# Patient Record
Sex: FEMALE | Race: WHITE | Employment: FULL TIME | URBAN - METROPOLITAN AREA
[De-identification: names, ages, dates, MRNs, and addresses within clinical notes are randomized per-mention and may not be internally consistent; named-entity substitution may affect disease eponyms.]

---

## 2018-05-10 ENCOUNTER — OFFICE VISIT (OUTPATIENT)
Dept: FAMILY MEDICINE CLINIC | Age: 53
End: 2018-05-10
Payer: MEDICARE

## 2018-05-10 VITALS
OXYGEN SATURATION: 98 % | BODY MASS INDEX: 25 KG/M2 | HEIGHT: 64 IN | WEIGHT: 146.4 LBS | SYSTOLIC BLOOD PRESSURE: 110 MMHG | TEMPERATURE: 98.3 F | DIASTOLIC BLOOD PRESSURE: 76 MMHG | HEART RATE: 83 BPM

## 2018-05-10 DIAGNOSIS — J06.9 ACUTE URI: Primary | ICD-10-CM

## 2018-05-10 PROCEDURE — G8427 DOCREV CUR MEDS BY ELIG CLIN: HCPCS | Performed by: FAMILY MEDICINE

## 2018-05-10 PROCEDURE — 1036F TOBACCO NON-USER: CPT | Performed by: FAMILY MEDICINE

## 2018-05-10 PROCEDURE — 99213 OFFICE O/P EST LOW 20 MIN: CPT | Performed by: FAMILY MEDICINE

## 2018-05-10 PROCEDURE — G8419 CALC BMI OUT NRM PARAM NOF/U: HCPCS | Performed by: FAMILY MEDICINE

## 2018-05-10 PROCEDURE — 3017F COLORECTAL CA SCREEN DOC REV: CPT | Performed by: FAMILY MEDICINE

## 2018-05-10 RX ORDER — AMOXICILLIN 250 MG/1
500 CAPSULE ORAL 2 TIMES DAILY
Qty: 40 CAPSULE | Refills: 0 | Status: SHIPPED | OUTPATIENT
Start: 2018-05-10 | End: 2018-05-20

## 2018-05-10 ASSESSMENT — ENCOUNTER SYMPTOMS
CONSTIPATION: 0
SORE THROAT: 0
RHINORRHEA: 1
SHORTNESS OF BREATH: 0
DIARRHEA: 0
COUGH: 1
ABDOMINAL PAIN: 0
WHEEZING: 0
VOICE CHANGE: 1

## 2018-05-10 ASSESSMENT — PATIENT HEALTH QUESTIONNAIRE - PHQ9
1. LITTLE INTEREST OR PLEASURE IN DOING THINGS: 0
SUM OF ALL RESPONSES TO PHQ QUESTIONS 1-9: 0
2. FEELING DOWN, DEPRESSED OR HOPELESS: 0
SUM OF ALL RESPONSES TO PHQ9 QUESTIONS 1 & 2: 0

## 2018-05-29 ENCOUNTER — HOSPITAL ENCOUNTER (OUTPATIENT)
Dept: GENERAL RADIOLOGY | Age: 53
Discharge: HOME | End: 2018-05-31
Payer: MEDICARE

## 2018-05-29 ENCOUNTER — HOSPITAL ENCOUNTER (OUTPATIENT)
Age: 53
Discharge: HOME | End: 2018-05-31
Payer: MEDICARE

## 2018-05-29 ENCOUNTER — OFFICE VISIT (OUTPATIENT)
Dept: FAMILY MEDICINE CLINIC | Age: 53
End: 2018-05-29
Payer: MEDICARE

## 2018-05-29 VITALS
BODY MASS INDEX: 26.19 KG/M2 | SYSTOLIC BLOOD PRESSURE: 98 MMHG | HEIGHT: 63 IN | OXYGEN SATURATION: 98 % | TEMPERATURE: 98 F | HEART RATE: 77 BPM | DIASTOLIC BLOOD PRESSURE: 62 MMHG | WEIGHT: 147.8 LBS

## 2018-05-29 DIAGNOSIS — R05.3 PERSISTENT COUGH FOR 3 WEEKS OR LONGER: Primary | ICD-10-CM

## 2018-05-29 DIAGNOSIS — R09.89 HYPERINFLATION OF LUNGS: ICD-10-CM

## 2018-05-29 DIAGNOSIS — R05.3 PERSISTENT COUGH FOR 3 WEEKS OR LONGER: ICD-10-CM

## 2018-05-29 PROCEDURE — 71046 X-RAY EXAM CHEST 2 VIEWS: CPT

## 2018-05-29 PROCEDURE — 99213 OFFICE O/P EST LOW 20 MIN: CPT | Performed by: PHYSICIAN ASSISTANT

## 2018-05-29 PROCEDURE — 3017F COLORECTAL CA SCREEN DOC REV: CPT | Performed by: PHYSICIAN ASSISTANT

## 2018-05-29 PROCEDURE — G8419 CALC BMI OUT NRM PARAM NOF/U: HCPCS | Performed by: PHYSICIAN ASSISTANT

## 2018-05-29 PROCEDURE — G8427 DOCREV CUR MEDS BY ELIG CLIN: HCPCS | Performed by: PHYSICIAN ASSISTANT

## 2018-05-29 PROCEDURE — 1036F TOBACCO NON-USER: CPT | Performed by: PHYSICIAN ASSISTANT

## 2018-05-29 RX ORDER — ALBUTEROL SULFATE 90 UG/1
2 AEROSOL, METERED RESPIRATORY (INHALATION) EVERY 6 HOURS PRN
Qty: 1 INHALER | Refills: 0 | Status: SHIPPED | OUTPATIENT
Start: 2018-05-29 | End: 2020-11-09 | Stop reason: ALTCHOICE

## 2018-05-29 RX ORDER — LORATADINE 10 MG/1
10 TABLET ORAL DAILY
Qty: 30 TABLET | Refills: 0 | Status: SHIPPED | OUTPATIENT
Start: 2018-05-29 | End: 2018-07-24 | Stop reason: CLARIF

## 2018-05-29 ASSESSMENT — ENCOUNTER SYMPTOMS
RHINORRHEA: 0
SORE THROAT: 0
COUGH: 1
WHEEZING: 0
SHORTNESS OF BREATH: 0
SINUS PAIN: 0
SINUS PRESSURE: 0
CHEST TIGHTNESS: 1

## 2018-05-30 ENCOUNTER — TELEPHONE (OUTPATIENT)
Dept: FAMILY MEDICINE CLINIC | Age: 53
End: 2018-05-30

## 2018-05-30 DIAGNOSIS — R09.89 HYPERINFLATION OF LUNGS: Primary | ICD-10-CM

## 2018-06-07 DIAGNOSIS — Z12.39 BREAST CANCER SCREENING: Primary | ICD-10-CM

## 2018-07-24 ENCOUNTER — OFFICE VISIT (OUTPATIENT)
Dept: PULMONOLOGY | Age: 53
End: 2018-07-24
Payer: MEDICARE

## 2018-07-24 VITALS
OXYGEN SATURATION: 99 % | BODY MASS INDEX: 26.22 KG/M2 | SYSTOLIC BLOOD PRESSURE: 114 MMHG | DIASTOLIC BLOOD PRESSURE: 70 MMHG | HEIGHT: 63 IN | RESPIRATION RATE: 16 BRPM | WEIGHT: 148 LBS | HEART RATE: 77 BPM

## 2018-07-24 DIAGNOSIS — R53.83 FATIGUE, UNSPECIFIED TYPE: ICD-10-CM

## 2018-07-24 DIAGNOSIS — J44.9 CHRONIC OBSTRUCTIVE PULMONARY DISEASE, UNSPECIFIED COPD TYPE (HCC): Primary | ICD-10-CM

## 2018-07-24 PROCEDURE — 99204 OFFICE O/P NEW MOD 45 MIN: CPT | Performed by: INTERNAL MEDICINE

## 2018-07-24 PROCEDURE — G8427 DOCREV CUR MEDS BY ELIG CLIN: HCPCS | Performed by: INTERNAL MEDICINE

## 2018-07-24 PROCEDURE — 3023F SPIROM DOC REV: CPT | Performed by: INTERNAL MEDICINE

## 2018-07-24 PROCEDURE — G8419 CALC BMI OUT NRM PARAM NOF/U: HCPCS | Performed by: INTERNAL MEDICINE

## 2018-07-24 PROCEDURE — 3017F COLORECTAL CA SCREEN DOC REV: CPT | Performed by: INTERNAL MEDICINE

## 2018-07-24 PROCEDURE — 1036F TOBACCO NON-USER: CPT | Performed by: INTERNAL MEDICINE

## 2018-07-24 PROCEDURE — G8926 SPIRO NO PERF OR DOC: HCPCS | Performed by: INTERNAL MEDICINE

## 2018-07-24 ASSESSMENT — ENCOUNTER SYMPTOMS
DIARRHEA: 0
SORE THROAT: 0
ABDOMINAL PAIN: 0
WHEEZING: 0
NAUSEA: 0
SHORTNESS OF BREATH: 0
CHEST TIGHTNESS: 0
COUGH: 0
VOMITING: 0
SINUS PRESSURE: 0
RHINORRHEA: 0

## 2018-07-24 NOTE — PROGRESS NOTES
congestion, nosebleeds, postnasal drip, rhinorrhea, sinus pressure, sneezing and sore throat. Eyes: Negative for visual disturbance. Respiratory: Negative for cough, chest tightness, shortness of breath and wheezing. Cardiovascular: Negative for chest pain, palpitations and leg swelling. Gastrointestinal: Negative for abdominal pain, diarrhea, nausea and vomiting. Genitourinary: Negative for dysuria and urgency. Musculoskeletal: Negative for arthralgias, joint swelling and myalgias. Skin: Negative for rash. Allergic/Immunologic: Negative for environmental allergies. Neurological: Negative for tremors, weakness, light-headedness and headaches. Psychiatric/Behavioral: Negative for behavioral problems and sleep disturbance. Objective:     Vitals:    07/24/18 1516   BP: 114/70   Pulse: 77   Resp: 16   SpO2: 99%   Weight: 148 lb (67.1 kg)   Height: 5' 3\" (1.6 m)         Physical Exam   Constitutional: She is oriented to person, place, and time. She appears well-developed and well-nourished. HENT:   Head: Normocephalic and atraumatic. Mouth/Throat: Oropharynx is clear and moist.   Eyes: EOM are normal. Pupils are equal, round, and reactive to light. Neck: No JVD present. No tracheal deviation present. No thyromegaly present. Cardiovascular: Normal rate and regular rhythm. Exam reveals no gallop. No murmur heard. Pulmonary/Chest: She has no wheezes. She has no rales. She exhibits no tenderness. Abdominal: She exhibits no distension. Musculoskeletal: Normal range of motion. She exhibits no edema. Neurological: She is alert and oriented to person, place, and time. Coordination normal.   Skin: Skin is warm and dry. No rash noted. Psychiatric: She has a normal mood and affect. Assessment:      Diagnosis Orders   1. Chronic obstructive pulmonary disease, unspecified COPD type ([facility] [address]  FULL PFT STUDY WITH PRE AND POST    Pulse oximetry, overnight   2.  Fatigue, unspecified type       The diagnosis of COPD, appropriate way to establish diagnoses and assess severity with PFTs were all discussed today at length. She is fairly asymptomatic at this point but reports excessive fatigue, lack of energy, and sleepiness especially since her recent illness. We will obtain PFTs and nocturnal oximetry testing then I'll see her for follow-up in 6 weeks      Plan:     Orders Placed This Encounter   Procedures    Pulse oximetry, overnight     Standing Status:   Future     Standing Expiration Date:   8/24/2018    FULL PFT STUDY WITH PRE AND POST     Standing Status:   Future     Standing Expiration Date:   8/24/2018     No orders of the defined types were placed in this encounter. Return in about 6 weeks (around 9/4/2018) for re-evaluation, after PFTs, review tests.       Levon Cormier MD

## 2018-08-07 ENCOUNTER — HOSPITAL ENCOUNTER (OUTPATIENT)
Dept: PULMONOLOGY | Age: 53
Discharge: HOME | End: 2018-08-07
Payer: MEDICARE

## 2018-08-07 DIAGNOSIS — J44.9 CHRONIC OBSTRUCTIVE PULMONARY DISEASE, UNSPECIFIED COPD TYPE (HCC): ICD-10-CM

## 2018-08-07 PROCEDURE — 94726 PLETHYSMOGRAPHY LUNG VOLUMES: CPT

## 2018-08-07 PROCEDURE — 94729 DIFFUSING CAPACITY: CPT

## 2018-08-07 PROCEDURE — 94729 DIFFUSING CAPACITY: CPT | Performed by: INTERNAL MEDICINE

## 2018-08-07 PROCEDURE — 94010 BREATHING CAPACITY TEST: CPT

## 2018-08-07 PROCEDURE — 94060 EVALUATION OF WHEEZING: CPT | Performed by: INTERNAL MEDICINE

## 2018-08-07 PROCEDURE — 94726 PLETHYSMOGRAPHY LUNG VOLUMES: CPT | Performed by: INTERNAL MEDICINE

## 2018-08-13 NOTE — PROCEDURES
[address]                       [address], [address] [facility]                                PULMONARY FUNCTION    PATIENT NAME: Weston De Leon                      :        1965  MED REC NO:   [id number]                            ROOM:  ACCOUNT NO:   [de-identified]                           ADMIT DATE: 2018  PROVIDER:     Santy Gramajo MD    DATE OF PROCEDURE:  2018    PFTs were done on this 69-year-old patient, who is 5 feet 3 inches, weighs  146 pounds with 20-year smoking history, quit 14 years ago, presenting with  dry cough and mild dyspnea. Spirometry showed a forced vital capacity of 3.21 L, which is 96% of  predicted. FEV1 was 2.47 L, which is 95% of predicted. FEV1/FVC ratio was  normal at 77%. FEF 25-75% was adequate at 2.04 L per second, which is 81%  of predicted. MVV was normal.    Lung volumes done by body plethysmography showed a total lung capacity of  5.53 L, which is 112% of predicted. Residual volume was 2.36 L, which is  129% of predicted with mild increased RV/TLC ratio to 43%. Diffusion capacity was normal at 21.83, which is 100% of predicted. Airway resistance was decreased. Specific airway conductance was slightly  increased. OVERALL IMPRESSION:  This study is generally within normal limits. No  significant obstructive, restrictive, or diffusion abnormality noted.         Twana Kanner, MD    D: 2018 10:15:21       T: 2018 12:25:35     [email or web address]  Job#: [id number]     Doc#: [id number]    CC:

## 2018-09-06 ENCOUNTER — OFFICE VISIT (OUTPATIENT)
Dept: PULMONOLOGY | Age: 53
End: 2018-09-06
Payer: MEDICARE

## 2018-09-06 VITALS
HEIGHT: 64 IN | WEIGHT: 148.6 LBS | DIASTOLIC BLOOD PRESSURE: 72 MMHG | HEART RATE: 63 BPM | OXYGEN SATURATION: 98 % | TEMPERATURE: 97.1 F | BODY MASS INDEX: 25.37 KG/M2 | SYSTOLIC BLOOD PRESSURE: 116 MMHG

## 2018-09-06 DIAGNOSIS — R09.89 PULMONARY AIR TRAPPING: Primary | ICD-10-CM

## 2018-09-06 DIAGNOSIS — Z87.891 HISTORY OF SMOKING: ICD-10-CM

## 2018-09-06 PROCEDURE — 3017F COLORECTAL CA SCREEN DOC REV: CPT | Performed by: INTERNAL MEDICINE

## 2018-09-06 PROCEDURE — 1036F TOBACCO NON-USER: CPT | Performed by: INTERNAL MEDICINE

## 2018-09-06 PROCEDURE — G8419 CALC BMI OUT NRM PARAM NOF/U: HCPCS | Performed by: INTERNAL MEDICINE

## 2018-09-06 PROCEDURE — G8427 DOCREV CUR MEDS BY ELIG CLIN: HCPCS | Performed by: INTERNAL MEDICINE

## 2018-09-06 PROCEDURE — 99213 OFFICE O/P EST LOW 20 MIN: CPT | Performed by: INTERNAL MEDICINE

## 2019-05-29 DIAGNOSIS — Z12.39 SCREENING FOR BREAST CANCER: Primary | ICD-10-CM

## 2019-11-13 ENCOUNTER — TELEPHONE (OUTPATIENT)
Dept: FAMILY MEDICINE CLINIC | Age: 54
End: 2019-11-13

## 2020-06-12 ENCOUNTER — TELEPHONE (OUTPATIENT)
Dept: FAMILY MEDICINE CLINIC | Age: 55
End: 2020-06-12

## 2020-11-09 ENCOUNTER — OFFICE VISIT (OUTPATIENT)
Dept: FAMILY MEDICINE CLINIC | Age: 55
End: 2020-11-09
Payer: COMMERCIAL

## 2020-11-09 ENCOUNTER — HOSPITAL ENCOUNTER (OUTPATIENT)
Age: 55
Setting detail: SPECIMEN
Discharge: HOME | End: 2020-11-09
Payer: COMMERCIAL

## 2020-11-09 VITALS
HEART RATE: 76 BPM | WEIGHT: 144 LBS | TEMPERATURE: 98.2 F | SYSTOLIC BLOOD PRESSURE: 100 MMHG | HEIGHT: 63 IN | DIASTOLIC BLOOD PRESSURE: 68 MMHG | BODY MASS INDEX: 25.52 KG/M2 | OXYGEN SATURATION: 97 %

## 2020-11-09 LAB
HBA1C MFR BLD: 5.3 % (ref 4.8–5.9)
HCT VFR BLD CALC: 38.6 % (ref 37–47)
HEMOGLOBIN: 12.9 G/DL (ref 12–16)
MCH RBC QN AUTO: 32.1 PG (ref 27–31.3)
MCHC RBC AUTO-ENTMCNC: 33.5 % (ref 33–37)
MCV RBC AUTO: 95.7 FL (ref 82–100)
PDW BLD-RTO: 13.4 % (ref 11.5–14.5)
PLATELET # BLD: 307 K/UL (ref 130–400)
RBC # BLD: 4.03 M/UL (ref 4.2–5.4)
WBC # BLD: 7.5 K/UL (ref 4.8–10.8)

## 2020-11-09 PROCEDURE — 83721 ASSAY OF BLOOD LIPOPROTEIN: CPT

## 2020-11-09 PROCEDURE — 85027 COMPLETE CBC AUTOMATED: CPT

## 2020-11-09 PROCEDURE — 80061 LIPID PANEL: CPT

## 2020-11-09 PROCEDURE — 87389 HIV-1 AG W/HIV-1&-2 AB AG IA: CPT

## 2020-11-09 PROCEDURE — 80053 COMPREHEN METABOLIC PANEL: CPT

## 2020-11-09 PROCEDURE — 86803 HEPATITIS C AB TEST: CPT

## 2020-11-09 PROCEDURE — 99396 PREV VISIT EST AGE 40-64: CPT | Performed by: FAMILY MEDICINE

## 2020-11-09 PROCEDURE — 83036 HEMOGLOBIN GLYCOSYLATED A1C: CPT

## 2020-11-09 ASSESSMENT — ENCOUNTER SYMPTOMS
SHORTNESS OF BREATH: 0
ABDOMINAL PAIN: 0
DIARRHEA: 0
CONSTIPATION: 0
RHINORRHEA: 0
SORE THROAT: 0
WHEEZING: 0
COUGH: 0

## 2020-11-09 ASSESSMENT — PATIENT HEALTH QUESTIONNAIRE - PHQ9
2. FEELING DOWN, DEPRESSED OR HOPELESS: 0
SUM OF ALL RESPONSES TO PHQ QUESTIONS 1-9: 0
1. LITTLE INTEREST OR PLEASURE IN DOING THINGS: 0
SUM OF ALL RESPONSES TO PHQ9 QUESTIONS 1 & 2: 0
SUM OF ALL RESPONSES TO PHQ QUESTIONS 1-9: 0
SUM OF ALL RESPONSES TO PHQ QUESTIONS 1-9: 0

## 2020-11-09 NOTE — PROGRESS NOTES
[address] PRIMARY CARE  [address] [place] [address]  Dept: [phone]  Dept Fax: : [phone]   Chief Complaint  Chief Complaint   Patient presents with    Annual Exam     for life insurance. needs fasting labs ordered       HPI:  60 y. o.female who presents for annual exam:    Works at [employer], a bakery, and a restaurant. Had beef sandwich at lunch. Nonsmoker. History reviewed. No pertinent past medical history.   Past Surgical History:   Procedure Laterality Date    BREAST SURGERY  2010    minor, malignant    COLONOSCOPY  2016    DR Sana Bunch    HEMORRHOID SURGERY  10/10/2016    DR Sana Bunch     Social History     Socioeconomic History    Marital status: Single     Spouse name: Not on file    Number of children: Not on file    Years of education: Not on file    Highest education level: Not on file   Occupational History    Not on file   Social Needs    Financial resource strain: Not on file    Food insecurity     Worry: Not on file     Inability: Not on file    Transportation needs     Medical: Not on file     Non-medical: Not on file   Tobacco Use    Smoking status: Former Smoker     Packs/day: 0.75     Types: Cigarettes     Start date: 2000     Last attempt to quit: 2004     Years since quittin.8    Smokeless tobacco: Former User     Quit date: 2004   Substance and Sexual Activity    Alcohol use: No    Drug use: No    Sexual activity: Never   Lifestyle    Physical activity     Days per week: Not on file     Minutes per session: Not on file    Stress: Not on file   Relationships    Social connections     Talks on phone: Not on file     Gets together: Not on file     Attends Episcopalian service: Not on file     Active member of club or organization: Not on file     Attends meetings of clubs or organizations: Not on file     Relationship status: Not on file    Intimate partner violence     Fear of current or ex partner: Not on file     Emotionally abused: Not on file     Physically abused: Not on file     Forced sexual activity: Not on file   Other Topics Concern    Not on file   Social History Narrative    Not on file     No Known Allergies  Current Outpatient Medications   Medication Sig Dispense Refill    polyethylene glycol (GLYCOLAX) powder as needed        No current facility-administered medications for this visit. ROS:  Review of Systems   Constitutional: Negative for chills and fever. HENT: Negative for rhinorrhea and sore throat. Respiratory: Negative for cough, shortness of breath and wheezing. Gastrointestinal: Negative for abdominal pain, constipation and diarrhea. Endocrine: Negative for polydipsia and polyuria. Genitourinary: Negative for dysuria, frequency and urgency. Neurological: Negative for syncope, light-headedness, numbness and headaches. Psychiatric/Behavioral: Negative for sleep disturbance. The patient is not nervous/anxious. Vitals:    11/09/20 1335   BP: 100/68   Pulse: 76   Temp: 98.2 °F (36.8 °C)   TempSrc: Temporal   SpO2: 97%   Weight: 144 lb (65.3 kg)   Height: 5' 3\" (1.6 m)       Physical exam:  Physical Exam  Vitals signs reviewed. Constitutional:       General: She is not in acute distress. Appearance: She is well-developed. HENT:      Head: Normocephalic and atraumatic. Right Ear: Tympanic membrane, ear canal and external ear normal. Tympanic membrane is not erythematous. Tympanic membrane has normal mobility. Left Ear: Tympanic membrane, ear canal and external ear normal. Tympanic membrane is not erythematous. Tympanic membrane has normal mobility. Nose: Nose normal.      Mouth/Throat:      Pharynx: No oropharyngeal exudate. Neck:      Musculoskeletal: Normal range of motion. Thyroid: No thyromegaly.    Cardiovascular:      Rate and Rhythm: Normal rate and regular rhythm. Heart sounds: Normal heart sounds. No murmur. Pulmonary:      Effort: Pulmonary effort is normal. No respiratory distress. Breath sounds: Normal breath sounds. No wheezing. Abdominal:      General: Bowel sounds are normal. There is no distension. Palpations: Abdomen is soft. Tenderness: There is no abdominal tenderness. There is no guarding or rebound. Lymphadenopathy:      Cervical: No cervical adenopathy. Skin:     General: Skin is warm and dry. Neurological:      Mental Status: She is alert and oriented to person, place, and time. Psychiatric:         Behavior: Behavior normal.         Assessment/Plan:  60 y.o. female here mainly for annual exam:  - routine labs; she isn't fasting but returning for labs would be difficult so will get nonfasting today  - she will consider scheduling for PAP     Diagnosis Orders   1. Annual physical exam  Lipid Panel    LDL Cholesterol, Direct    Comprehensive Metabolic Panel    Hemoglobin A1C    CBC   2. Encounter for screening for HIV  HIV-1,2 Combo Ag/Ab By IJEOMA, Reflexive Panel   3.  Encounter for hepatitis C screening test for low risk patient  Hepatitis C Antibody        Return in about 1 year (around 11/9/2021) for annual exam.    Kashmir Hawthorne MD

## 2020-11-10 PROBLEM — R76.8 HEPATITIS C ANTIBODY POSITIVE IN BLOOD: Status: ACTIVE | Noted: 2020-11-10

## 2020-11-10 LAB
ALBUMIN SERPL-MCNC: 4.1 G/DL (ref 3.5–4.6)
ALP BLD-CCNC: 77 U/L (ref 40–130)
ALT SERPL-CCNC: 18 U/L (ref 0–33)
ANION GAP SERPL CALCULATED.3IONS-SCNC: 10 MEQ/L (ref 9–15)
AST SERPL-CCNC: 24 U/L (ref 0–35)
BILIRUB SERPL-MCNC: 0.3 MG/DL (ref 0.2–0.7)
BUN BLDV-MCNC: 23 MG/DL (ref 6–20)
CALCIUM SERPL-MCNC: 8.9 MG/DL (ref 8.5–9.9)
CHLORIDE BLD-SCNC: 109 MEQ/L (ref 95–107)
CHOLESTEROL, TOTAL: 153 MG/DL (ref 0–199)
CO2: 23 MEQ/L (ref 20–31)
CREAT SERPL-MCNC: 0.59 MG/DL (ref 0.5–0.9)
GFR AFRICAN AMERICAN: >60
GFR NON-AFRICAN AMERICAN: >60
GLOBULIN: 2.5 G/DL (ref 2.3–3.5)
GLUCOSE BLD-MCNC: 87 MG/DL (ref 70–99)
HDLC SERPL-MCNC: 49 MG/DL (ref 40–59)
HEPATITIS C ANTIBODY INTERPRETATION: REACTIVE
LDL CHOLESTEROL CALCULATED: 73 MG/DL (ref 0–129)
LDL CHOLESTEROL DIRECT: 83 MG/DL (ref 0–129)
POTASSIUM SERPL-SCNC: 4.3 MEQ/L (ref 3.4–4.9)
SODIUM BLD-SCNC: 142 MEQ/L (ref 135–144)
TOTAL PROTEIN: 6.6 G/DL (ref 6.3–8)
TRIGL SERPL-MCNC: 157 MG/DL (ref 0–150)

## 2020-11-11 ENCOUNTER — HOSPITAL ENCOUNTER (OUTPATIENT)
Dept: LAB | Age: 55
Discharge: HOME | End: 2020-11-11
Payer: COMMERCIAL

## 2020-11-11 LAB — HIV 1,2 COMBO ANTIGEN/ANTIBODY: NEGATIVE

## 2020-11-11 PROCEDURE — 87522 HEPATITIS C REVRS TRNSCRPJ: CPT

## 2020-11-15 LAB
HCV QNT BY NAAT IU/ML: NOT DETECTED IU/ML
HCV QNT BY NAAT LOG IU/ML: NOT DETECTED LOG IU/ML
INTERPRETATION: NOT DETECTED

## 2020-11-23 ENCOUNTER — HOSPITAL ENCOUNTER (OUTPATIENT)
Dept: LAB | Age: 55
Discharge: HOME | End: 2020-11-23
Payer: COMMERCIAL

## 2020-11-23 LAB
HAV IGM SER IA-ACNC: NORMAL
HEPATITIS B CORE IGM ANTIBODY: NORMAL
HEPATITIS B SURFACE ANTIGEN INTERPRETATION: NORMAL
HEPATITIS C ANTIBODY INTERPRETATION: NORMAL
HEPATITIS INTERPRETATION:: NORMAL

## 2020-11-23 PROCEDURE — 36415 COLL VENOUS BLD VENIPUNCTURE: CPT

## 2020-11-23 PROCEDURE — 80074 ACUTE HEPATITIS PANEL: CPT

## 2020-11-24 ENCOUNTER — TELEPHONE (OUTPATIENT)
Dept: ADMINISTRATIVE | Age: 55
End: 2020-11-24

## 2021-04-06 ENCOUNTER — TELEPHONE (OUTPATIENT)
Dept: FAMILY MEDICINE CLINIC | Age: 56
End: 2021-04-06

## 2021-08-12 ENCOUNTER — TELEPHONE (OUTPATIENT)
Dept: FAMILY MEDICINE CLINIC | Age: 56
End: 2021-08-12

## 2022-01-10 ENCOUNTER — NURSE TRIAGE (OUTPATIENT)
Dept: OTHER | Facility: CLINIC | Age: 57
End: 2022-01-10

## 2022-01-10 ENCOUNTER — VIRTUAL VISIT (OUTPATIENT)
Dept: FAMILY MEDICINE CLINIC | Age: 57
End: 2022-01-10
Payer: COMMERCIAL

## 2022-01-10 DIAGNOSIS — J10.1 INFLUENZA A: Primary | ICD-10-CM

## 2022-01-10 DIAGNOSIS — R53.83 FATIGUE, UNSPECIFIED TYPE: ICD-10-CM

## 2022-01-10 DIAGNOSIS — R05.9 COUGH: ICD-10-CM

## 2022-01-10 LAB
INFLUENZA A ANTIBODY: POSITIVE
INFLUENZA B ANTIBODY: NEGATIVE
Lab: NORMAL
PERFORMING INSTRUMENT: NORMAL
QC PASS/FAIL: NORMAL
SARS-COV-2, POC: NORMAL

## 2022-01-10 PROCEDURE — 99213 OFFICE O/P EST LOW 20 MIN: CPT | Performed by: FAMILY MEDICINE

## 2022-01-10 PROCEDURE — 87426 SARSCOV CORONAVIRUS AG IA: CPT | Performed by: FAMILY MEDICINE

## 2022-01-10 PROCEDURE — 3017F COLORECTAL CA SCREEN DOC REV: CPT | Performed by: FAMILY MEDICINE

## 2022-01-10 PROCEDURE — G8427 DOCREV CUR MEDS BY ELIG CLIN: HCPCS | Performed by: FAMILY MEDICINE

## 2022-01-10 PROCEDURE — G8484 FLU IMMUNIZE NO ADMIN: HCPCS | Performed by: FAMILY MEDICINE

## 2022-01-10 PROCEDURE — 87804 INFLUENZA ASSAY W/OPTIC: CPT | Performed by: FAMILY MEDICINE

## 2022-01-10 PROCEDURE — 1036F TOBACCO NON-USER: CPT | Performed by: FAMILY MEDICINE

## 2022-01-10 PROCEDURE — G8421 BMI NOT CALCULATED: HCPCS | Performed by: FAMILY MEDICINE

## 2022-01-10 RX ORDER — OSELTAMIVIR PHOSPHATE 75 MG/1
75 CAPSULE ORAL 2 TIMES DAILY
Qty: 10 CAPSULE | Refills: 0 | Status: SHIPPED | OUTPATIENT
Start: 2022-01-10 | End: 2022-01-15

## 2022-01-10 SDOH — ECONOMIC STABILITY: FOOD INSECURITY: WITHIN THE PAST 12 MONTHS, YOU WORRIED THAT YOUR FOOD WOULD RUN OUT BEFORE YOU GOT MONEY TO BUY MORE.: NEVER TRUE

## 2022-01-10 SDOH — ECONOMIC STABILITY: FOOD INSECURITY: WITHIN THE PAST 12 MONTHS, THE FOOD YOU BOUGHT JUST DIDN'T LAST AND YOU DIDN'T HAVE MONEY TO GET MORE.: NEVER TRUE

## 2022-01-10 ASSESSMENT — PATIENT HEALTH QUESTIONNAIRE - PHQ9
SUM OF ALL RESPONSES TO PHQ QUESTIONS 1-9: 0
SUM OF ALL RESPONSES TO PHQ9 QUESTIONS 1 & 2: 0
2. FEELING DOWN, DEPRESSED OR HOPELESS: 0
1. LITTLE INTEREST OR PLEASURE IN DOING THINGS: 0
SUM OF ALL RESPONSES TO PHQ QUESTIONS 1-9: 0

## 2022-01-10 ASSESSMENT — ENCOUNTER SYMPTOMS
SHORTNESS OF BREATH: 0
CONSTIPATION: 0
COUGH: 1
ABDOMINAL PAIN: 0
DIARRHEA: 0
WHEEZING: 0
RHINORRHEA: 0
SORE THROAT: 1

## 2022-01-10 ASSESSMENT — SOCIAL DETERMINANTS OF HEALTH (SDOH): HOW HARD IS IT FOR YOU TO PAY FOR THE VERY BASICS LIKE FOOD, HOUSING, MEDICAL CARE, AND HEATING?: NOT HARD AT ALL

## 2022-01-10 NOTE — PROGRESS NOTES
[address]  Virtual Visit  [address] [facility] [address] [facility] PRIMARY CARE  [address] [address]  Dept: [phone]  Dept Fax: : [phone]     Due to COVID 23 outbreak, patient's office visit was converted to a virtual visit. Patient was contacted and agreed to proceed with a virtual visit via Doxy. me  The risks and benefits of converting to a virtual visit were discussed in light of the current infectious disease epidemic. Patient also understood that insurance coverage and co-pays are up to their individual insurance plans. Chief Complaint  Chief Complaint   Patient presents with    Fatigue     x 3 to 4 days, cough, sleeping alot, neck pain        HPI:  60 y. o.female who presents for the following:      Unwell feeling: worsening symptoms with cough, sore throat, HA, fatigue, sleeping more; thinks it started around 1/4; tolerating PO; has been around a few sick co-workers with [employer]; family also had COVID around 12/25; using Olvin's vapor rub and dayquil/nyquil and mucinex      Review of Systems   Constitutional: Positive for fatigue. Negative for chills and fever. HENT: Positive for sore throat. Negative for congestion and rhinorrhea. Respiratory: Positive for cough. Negative for shortness of breath and wheezing. Gastrointestinal: Negative for abdominal pain, constipation and diarrhea. Endocrine: Negative for polydipsia and polyuria. Genitourinary: Negative for dysuria, frequency and urgency. Neurological: Positive for headaches. Negative for syncope, light-headedness and numbness. Psychiatric/Behavioral: Positive for sleep disturbance. The patient is not nervous/anxious. No past medical history on file.   Past Surgical History:   Procedure Laterality Date    BREAST SURGERY  2010    minor, malignant    COLONOSCOPY  08/29/2016    DR Morgan Bai    HEMORRHOID SURGERY  10/10/2016    DR Morgan Bai Social History     Socioeconomic History    Marital status: Single     Spouse name: Not on file    Number of children: Not on file    Years of education: Not on file    Highest education level: Not on file   Occupational History    Not on file   Tobacco Use    Smoking status: Former Smoker     Packs/day: 0.75     Types: Cigarettes     Start date: 2000     Quit date: 2004     Years since quittin.0    Smokeless tobacco: Former User     Quit date: 2004   Substance and Sexual Activity    Alcohol use: No    Drug use: No    Sexual activity: Never   Other Topics Concern    Not on file   Social History Narrative    Not on file     Social Determinants of Health     Financial Resource Strain: Low Risk     Difficulty of Paying Living Expenses: Not hard at all   Food Insecurity: No Food Insecurity    Worried About [address] in the Last Year: Never true    [address] [email or web address] in the Last Year: Never true   Transportation Needs:     Lack of Transportation (Medical): Not on file    Lack of Transportation (Non-Medical):  Not on file   Physical Activity:     Days of Exercise per Week: Not on file    Minutes of Exercise per Session: Not on file   Stress:     Feeling of Stress : Not on file   Social Connections:     Frequency of Communication with Friends and Family: Not on file    Frequency of Social Gatherings with Friends and Family: Not on file    Attends Muslim Services: Not on file    Active Member of Clubs or Organizations: Not on file    Attends Club or Organization Meetings: Not on file    Marital Status: Not on file   Intimate Partner Violence:     Fear of Current or Ex-Partner: Not on file    Emotionally Abused: Not on file    Physically Abused: Not on file    Sexually Abused: Not on file   Housing Stability:     Unable to Pay for Housing in the Last Year: Not on file    Number of [place] in the Last Year: Not on file    Unstable Housing in the Last Year: Not on file     Family History   Problem Relation Age of Onset    Dementia Mother     High Blood Pressure Mother     COPD Father     Emphysema Father     High Blood Pressure Father       No Known Allergies  Current Outpatient Medications   Medication Sig Dispense Refill    oseltamivir (TAMIFLU) 75 MG capsule Take 1 capsule by mouth 2 times daily for 5 days 10 capsule 0     No current facility-administered medications for this visit. Physical exam:  Physical Exam  Constitutional:       General: She is not in acute distress. Appearance: Normal appearance. She is not ill-appearing. HENT:      Head: Normocephalic and atraumatic. Pulmonary:      Effort: Pulmonary effort is normal. No respiratory distress. Musculoskeletal:      Cervical back: Normal range of motion. Neurological:      Mental Status: She is alert. Assessment/Plan:  60 y.o. female here mainly for unwell feeling:  - tested neg for COVID and pos for flu A. Provided tamiflu     Diagnosis Orders   1. Influenza A  POCT COVID-19, Antigen    POCT Influenza A/B   2. Fatigue, unspecified type  POCT COVID-19, Antigen   3. Cough  POCT COVID-19, Antigen        Return if symptoms worsen or fail to improve. Wilfrid Aranda MD       Pursuant to the emergency declaration under the [facility], [facility] waiver authority and the [employer] and [employer] Act, this Virtual  Visit was conducted, with patient's consent, to reduce the patient's risk of exposure to COVID-19 and provide continuity of care for an established patient. Services were provided through a video synchronous discussion virtually to substitute for in-person clinic visit.

## 2022-01-10 NOTE — TELEPHONE ENCOUNTER
Received call from Stephanie Reyna at [facility] with Red Flag Complaint. Subjective: Caller states \"dizzy fatigue and chills \"     Current Symptoms: dizzy fatigue and chills, chest feels tight and ear hurts occas occas cough, back of neck pain    Onset: 2 days    Associated Symptoms: NA    Pain Severity: 1/10      Temperature: unsure    What has been tried: dayquil, nyquil, muccinex, advil    LMP: NA Pregnant: NA    Recommended disposition: call back from office    Care advice provided, patient verbalizes understanding; denies any other questions or concerns; instructed to call back for any new or worsening symptoms. sent call to office and spoke with Curt Omalley who took the call    Attention Provider: Thank you for allowing me to participate in the care of your patient. The patient was connected to triage in response to information provided to the ECC/PSC. Please do not respond through this encounter as the response is not directed to a shared pool.           Reason for Disposition   [1] COVID-19 infection suspected by caller or triager AND [2] mild symptoms (cough, fever, or others) AND [3] negative COVID-19 rapid test    Protocols used: CORONAVIRUS (COVID-19) DIAGNOSED OR SUSPECTED-ADULT-[place]

## 2022-03-25 ENCOUNTER — TELEPHONE (OUTPATIENT)
Dept: INTERNAL MEDICINE | Age: 57
End: 2022-03-25

## 2022-07-27 ENCOUNTER — TELEPHONE (OUTPATIENT)
Dept: PRIMARY CARE CLINIC | Age: 57
End: 2022-07-27

## 2022-07-27 DIAGNOSIS — Z12.31 ENCOUNTER FOR SCREENING MAMMOGRAM FOR MALIGNANT NEOPLASM OF BREAST: Primary | ICD-10-CM

## 2022-09-28 ENCOUNTER — TELEPHONE (OUTPATIENT)
Dept: FAMILY MEDICINE CLINIC | Age: 57
End: 2022-09-28

## 2022-11-10 ENCOUNTER — TELEPHONE (OUTPATIENT)
Dept: INFECTIOUS DISEASES | Age: 57
End: 2022-11-10

## 2022-11-10 NOTE — TELEPHONE ENCOUNTER
Attempt to reach patient for mammogram order. Called patient @ [phone] and left message on machine for patient to return call during normal business hours of 8:30 AM and 5 PM @ [phone].

## 2022-12-02 ENCOUNTER — OFFICE VISIT (OUTPATIENT)
Dept: INTERNAL MEDICINE | Age: 57
End: 2022-12-02
Payer: COMMERCIAL

## 2022-12-02 VITALS
TEMPERATURE: 98 F | HEIGHT: 63 IN | DIASTOLIC BLOOD PRESSURE: 66 MMHG | WEIGHT: 155 LBS | HEART RATE: 64 BPM | OXYGEN SATURATION: 98 % | BODY MASS INDEX: 27.46 KG/M2 | SYSTOLIC BLOOD PRESSURE: 102 MMHG

## 2022-12-02 DIAGNOSIS — R05.1 ACUTE COUGH: Primary | ICD-10-CM

## 2022-12-02 PROBLEM — M47.812 SPONDYLOSIS WITHOUT MYELOPATHY OR RADICULOPATHY, CERVICAL REGION: Status: ACTIVE | Noted: 2022-09-30

## 2022-12-02 PROBLEM — E04.1 NONTOXIC SINGLE THYROID NODULE: Status: ACTIVE | Noted: 2022-07-08

## 2022-12-02 LAB
INFLUENZA A ANTIBODY: NORMAL
INFLUENZA B ANTIBODY: NORMAL

## 2022-12-02 PROCEDURE — 99213 OFFICE O/P EST LOW 20 MIN: CPT

## 2022-12-02 PROCEDURE — 87804 INFLUENZA ASSAY W/OPTIC: CPT

## 2022-12-02 RX ORDER — TOPIRAMATE 100 MG/1
TABLET, FILM COATED ORAL
COMMUNITY
Start: 2022-11-01

## 2022-12-02 RX ORDER — METHOCARBAMOL 500 MG/1
TABLET, FILM COATED ORAL
COMMUNITY
Start: 2022-08-29

## 2022-12-02 RX ORDER — IBUPROFEN 800 MG/1
TABLET ORAL
COMMUNITY
Start: 2022-10-03

## 2022-12-02 RX ORDER — AMOXICILLIN 500 MG/1
500 CAPSULE ORAL 2 TIMES DAILY
Qty: 20 CAPSULE | Refills: 0 | Status: SHIPPED | OUTPATIENT
Start: 2022-12-02 | End: 2022-12-12

## 2022-12-02 RX ORDER — PREDNISONE 20 MG/1
TABLET ORAL
Qty: 25 TABLET | Refills: 0 | Status: SHIPPED | OUTPATIENT
Start: 2022-12-02 | End: 2022-12-22

## 2022-12-02 ASSESSMENT — ENCOUNTER SYMPTOMS
WHEEZING: 0
EYE REDNESS: 0
COUGH: 1
EYE ITCHING: 0
EYE DISCHARGE: 0
SINUS PRESSURE: 0
SORE THROAT: 0
SHORTNESS OF BREATH: 0
RHINORRHEA: 0

## 2022-12-02 NOTE — PROGRESS NOTES
Subjective  Mirtha Rasmussen, 59 y.o. female presents today with:  Chief Complaint   Patient presents with    Cough     Fatigue, started last Wednesday, at home covid was negative, productive cough       Other  This is a new problem. The current episode started in the past 7 days (Symptoms began last Wednesday. ). The problem occurs constantly. The problem has been waxing and waning. Associated symptoms include congestion, coughing (dry cough at first and now mucousy and productive) and fatigue. Pertinent negatives include no arthralgias, chest pain, chills, fever, headaches, myalgias, neck pain or sore throat. Exacerbated by: lying down. Treatments tried: dayquil/nyquil. The treatment provided mild relief. Pt states she has a history of a cough that lasted over 3 months in 2018. Was evaluated for COPD and was told she does not have COPD. Review of Systems   Constitutional:  Positive for fatigue. Negative for chills and fever. HENT:  Positive for congestion. Negative for ear pain, postnasal drip, rhinorrhea, sinus pressure and sore throat. Eyes:  Negative for discharge, redness and itching. Respiratory:  Positive for cough (dry cough at first and now mucousy and productive). Negative for shortness of breath and wheezing. Cardiovascular:  Negative for chest pain and palpitations. Musculoskeletal:  Negative for arthralgias, myalgias and neck pain. Neurological:  Negative for dizziness, light-headedness and headaches. PMH, Surgical Hx, Family Hx, and Social Hx reviewed and updated.       Objective  Vitals:    12/02/22 1315   BP: 102/66   Pulse: 64   Temp: 98 °F (36.7 °C)   TempSrc: Infrared   SpO2: 98%   Weight: 155 lb (70.3 kg)   Height: 5' 3\" (1.6 m)     BP Readings from Last 3 Encounters:   12/02/22 102/66   11/09/20 100/68   09/06/18 116/72     Wt Readings from Last 3 Encounters:   12/02/22 155 lb (70.3 kg)   11/09/20 144 lb (65.3 kg)   09/06/18 148 lb 9.6 oz (67.4 kg)     Physical Exam  Vitals reviewed. Constitutional:       General: She is not in acute distress. HENT:      Head: Normocephalic and atraumatic. Right Ear: Tympanic membrane and external ear normal.      Left Ear: Tympanic membrane and external ear normal.      Nose: Mucosal edema and rhinorrhea present. Rhinorrhea is clear. Right Turbinates: Not swollen. Left Turbinates: Not swollen. Right Sinus: No maxillary sinus tenderness or frontal sinus tenderness. Left Sinus: No maxillary sinus tenderness or frontal sinus tenderness. Mouth/Throat:      Lips: Pink. Mouth: Mucous membranes are moist.      Pharynx: Uvula midline. No oropharyngeal exudate or posterior oropharyngeal erythema. Tonsils: No tonsillar exudate. Eyes:      General: Lids are normal.   Cardiovascular:      Rate and Rhythm: Normal rate and regular rhythm. Heart sounds: Normal heart sounds. Pulmonary:      Effort: Pulmonary effort is normal. No accessory muscle usage, respiratory distress or retractions. Breath sounds: Normal air entry. No stridor or decreased air movement. Wheezing (faint, scattered) present. No decreased breath sounds, rhonchi or rales. Musculoskeletal:      Cervical back: Normal range of motion. Skin:     General: Skin is warm. Neurological:      Mental Status: She is alert and oriented to person, place, and time. Mental status is at baseline. Psychiatric:         Behavior: Behavior is cooperative. Assessment & Plan   1. Acute cough  -     POCT Influenza A/B  -     amoxicillin (AMOXIL) 500 MG capsule; Take 1 capsule by mouth 2 times daily for 10 days, Disp-20 capsule, R-0Normal  -     predniSONE (DELTASONE) 20 MG tablet; Take 2 tablets by mouth daily for 5 days, THEN 1.5 tablets daily for 5 days, THEN 1 tablet daily for 5 days, THEN 0.5 tablets daily for 5 days. , Disp-25 tablet, R-0Normal  -OTC medication for symptom control  -Hydration  -Saline nasal spray for nasal congestion  -Humidifier  -Seek care in ER if symptoms unmanageable, difficulty breathing or swallowing, or any other concerning symptoms      Orders Placed This Encounter   Procedures    POCT Influenza A/B     Orders Placed This Encounter   Medications    amoxicillin (AMOXIL) 500 MG capsule     Sig: Take 1 capsule by mouth 2 times daily for 10 days     Dispense:  20 capsule     Refill:  0    predniSONE (DELTASONE) 20 MG tablet     Sig: Take 2 tablets by mouth daily for 5 days, THEN 1.5 tablets daily for 5 days, THEN 1 tablet daily for 5 days, THEN 0.5 tablets daily for 5 days. Dispense:  25 tablet     Refill:  0       Return if symptoms worsen or fail to improve, for follow up with PCP. Reviewed with the patient: current clinical status,medications, activities and diet. Side effects, adverse effects of themedication prescribed today, as well as treatment plan/ rationale and result expectations have been discussed with the patient who expresses understanding and desires to proceed. Close follow up to evaluate treatment results and for coordination of care. I have reviewed the patient's medical history in detail and updated the computerized patient record.     JOE Lange NP

## 2023-04-04 ENCOUNTER — HOSPITAL ENCOUNTER (OUTPATIENT)
Dept: DATA CONVERSION | Facility: HOSPITAL | Age: 58
End: 2023-04-04
Attending: ANESTHESIOLOGY | Admitting: ANESTHESIOLOGY
Payer: COMMERCIAL

## 2023-04-04 DIAGNOSIS — M54.2 CERVICALGIA: ICD-10-CM

## 2023-04-04 DIAGNOSIS — M54.81 OCCIPITAL NEURALGIA: ICD-10-CM

## 2023-04-25 ENCOUNTER — HOSPITAL ENCOUNTER (OUTPATIENT)
Dept: DATA CONVERSION | Facility: HOSPITAL | Age: 58
End: 2023-04-25
Attending: ANESTHESIOLOGY
Payer: COMMERCIAL

## 2023-04-25 DIAGNOSIS — R51.9 HEADACHE, UNSPECIFIED: ICD-10-CM

## 2023-06-08 ENCOUNTER — TELEPHONE (OUTPATIENT)
Dept: INTERNAL MEDICINE | Age: 58
End: 2023-06-08
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
## 2023-10-04 PROBLEM — G44.86 CERVICOGENIC HEADACHE: Status: ACTIVE | Noted: 2023-10-04

## 2023-10-04 RX ORDER — OXYCODONE AND ACETAMINOPHEN 5; 325 MG/1; MG/1
1 TABLET ORAL EVERY 6 HOURS PRN
COMMUNITY
Start: 2022-03-22

## 2023-10-04 RX ORDER — TOPIRAMATE 200 MG/1
1 TABLET ORAL 2 TIMES DAILY
COMMUNITY
Start: 2023-02-18

## 2023-10-04 RX ORDER — IBUPROFEN 800 MG/1
800 TABLET ORAL 3 TIMES DAILY PRN
COMMUNITY

## 2023-10-04 RX ORDER — TOPIRAMATE 25 MG/1
25 TABLET ORAL
COMMUNITY
Start: 2022-09-15

## 2023-10-04 RX ORDER — TOPIRAMATE 100 MG/1
1 TABLET, FILM COATED ORAL 2 TIMES DAILY
COMMUNITY
Start: 2022-12-13

## 2023-10-04 RX ORDER — NAPROXEN 500 MG/1
500 TABLET ORAL 2 TIMES DAILY PRN
COMMUNITY
Start: 2022-03-22

## 2023-10-04 RX ORDER — METHOCARBAMOL 500 MG/1
500 TABLET, FILM COATED ORAL 3 TIMES DAILY PRN
COMMUNITY
Start: 2022-03-31

## 2023-10-05 ENCOUNTER — TREATMENT (OUTPATIENT)
Dept: PHYSICAL THERAPY | Facility: CLINIC | Age: 58
End: 2023-10-05
Payer: COMMERCIAL

## 2023-10-05 DIAGNOSIS — R29.898 LEFT ARM WEAKNESS: Primary | ICD-10-CM

## 2023-10-05 PROCEDURE — 97110 THERAPEUTIC EXERCISES: CPT | Performed by: PHYSICAL THERAPIST

## 2023-10-05 PROCEDURE — 97140 MANUAL THERAPY 1/> REGIONS: CPT | Performed by: PHYSICAL THERAPIST

## 2023-10-05 ASSESSMENT — PAIN - FUNCTIONAL ASSESSMENT: PAIN_FUNCTIONAL_ASSESSMENT: 0-10

## 2023-10-05 ASSESSMENT — ENCOUNTER SYMPTOMS
DEPRESSION: 0
OCCASIONAL FEELINGS OF UNSTEADINESS: 0
LOSS OF SENSATION IN FEET: 0

## 2023-10-05 ASSESSMENT — PAIN SCALES - GENERAL: PAINLEVEL_OUTOF10: 4

## 2023-10-05 NOTE — PROGRESS NOTES
Physical Therapy    Physical Therapy Treatment    Patient Name: Yanni Gore  MRN: [id number]  Today's Date: 10/5/2023  Time Calculation  Start Time: 1015  Stop Time: 1100  Time Calculation (min): 45 min      Assessment:  Centralization of her cervical symptoms noted with unloaded extension force to her mid and lower cervical spine.  She has slightly more Lt shoulder AROM and her endurance is slowly progressing.         Plan:  Continued skilled PT necessary to abolish cervical and Lt shoulder symptoms, increase Lt shoulder ROM, strength and overall function with her dominant arm.        Current Problem  1. Left arm weakness            Subjective   Pt reports her shoulder is feeling good but her neck is sorer which is normal for her.  She states when her neck is better her Lt shoulder will hurt more.      3 out of 8 PT treatments     Precautions  Precautions  STEADI Fall Risk Score (The score of 4 or more indicates an increased risk of falling): 1       Pain  Pain Assessment: 0-10  Pain Score: 4  Pain Type: Chronic pain  Pain Location: Neck    Objective   Her Lt shoulder AROM is 140 degrees of flexion, 135 degrees of abduction and 60 degrees of ER.  She's able to reach behind her back to L2-3 and behind her neck to C7-T1.      Lt shoulder strength grossly 4-4+/5       Posture    Mild forward head alignment       Outcome Measures:  QuickDASH 34.09%    Treatments:  Therapeutic Exercise  Therapeutic Exercise Performed:  (free text)  Unloaded cervical retractions, 30 reps x 2  UBE, F/R, L3, 3 minutes each way  Wall slides, flexion and scaption, 1.5 lbs, 30 reps  Ball on wall, 1.1 lb, 30 reps  Med ball series, 3-way, 2.2 lbs, 30 reps  IR stretch with strap, 10 sec hold, 10 reps  Standing wands (5), 30 reps  Shoulder pulleys, flexion and abduction, 30 reps - NP        Manual therapy,  10 minutes of manual cervical traction       Goals:       Abolish all cervical and Lt shoulder symptoms.  Maintain proper cervical alignment  in sitting  Lt shoulder AROM WFL's and pain free  Normal Lt shoulder strength  No difficulty or pain during her basic, instrumental and work activities  Independent with her HEP

## 2023-10-10 ENCOUNTER — TREATMENT (OUTPATIENT)
Dept: PHYSICAL THERAPY | Facility: CLINIC | Age: 58
End: 2023-10-10
Payer: COMMERCIAL

## 2023-10-10 DIAGNOSIS — R29.898 LEFT ARM WEAKNESS: Primary | ICD-10-CM

## 2023-10-10 DIAGNOSIS — G44.86 CERVICOGENIC HEADACHE: ICD-10-CM

## 2023-10-10 PROCEDURE — 97110 THERAPEUTIC EXERCISES: CPT | Performed by: PHYSICAL THERAPIST

## 2023-10-10 ASSESSMENT — ENCOUNTER SYMPTOMS
LOSS OF SENSATION IN FEET: 0
DEPRESSION: 0
OCCASIONAL FEELINGS OF UNSTEADINESS: 0

## 2023-10-10 ASSESSMENT — PAIN - FUNCTIONAL ASSESSMENT: PAIN_FUNCTIONAL_ASSESSMENT: 0-10

## 2023-10-10 ASSESSMENT — PAIN SCALES - GENERAL: PAINLEVEL_OUTOF10: 2

## 2023-10-10 NOTE — PROGRESS NOTES
Physical Therapy    Physical Therapy Treatment    Patient Name: Yanni Gore  MRN: [id number]  Today's Date: 10/10/2023  Time Calculation  Start Time: 0145  Stop Time: 0228  Time Calculation (min): 43 min      Assessment:   Her Lt shoulder AROM is slightly improved but less laboring noted during her motion.  Her strength with MMT is about the same but her endurance is improving.  She tolerated wall slides with lift today with 1.5 lbs into flexion and scaption.  Added wall taps with 1.1 lb ball.  No pain after therapy.  More fatigue after PT.      Plan:  Continued skilled PT necessary to abolish cervical and Lt shoulder symptoms, increase Lt shoulder ROM, strength and overall function with her dominant arm.      Current Problem  1. Left arm weakness        2. Cervicogenic headache            Subjective   Pt reports her Lt shoulder and neck don't feel too bad as she just got off work and had a pretty stressful day.      4 out of 8 PT treatments     Precautions  Precautions  STEADI Fall Risk Score (The score of 4 or more indicates an increased risk of falling): 1       Pain  Pain Assessment: 0-10  Pain Score: 2  Pain Type: Chronic pain  Pain Location: Shoulder and Neck    Objective     Lt shoulder AROM: flexion 140, abduction 120, ER 60.  She is able to reach behind her back to L4-5 and behind her neck to C6-7.     4-4+/5 gross Lt shoulder strength      Posture  Mild forward head alignment       Treatments:     UBE, F/R, L3, 3 minutes each way  Wall slides, flexion and scaption with lift, 1.5 lbs, 30 reps *  Ball on wall, 1.1 lb, 30 reps  Med ball series, 3-way, 2.2 lbs, 30 reps  IR stretch with strap, 10 sec hold, 10 reps *  Standing wands, 2 lb, (5), 30 reps *  Shoulder pulleys, flexion and abduction, 30 reps * - NP  Rows, shoulder extension with scapular retraction, horizontal abduction and ER with scapular retractions, GTB, 20-30 reps *  Wall taps with 1.1 lb, 10 reps          Goals:  Abolish all cervical and Lt  shoulder symptoms.  Maintain proper cervical alignment in sitting  Lt shoulder AROM WFL's and pain free  Normal Lt shoulder strength  No difficulty or pain during her basic, instrumental and work activities  Independent with her HEP

## 2023-10-12 ENCOUNTER — TREATMENT (OUTPATIENT)
Dept: PHYSICAL THERAPY | Facility: CLINIC | Age: 58
End: 2023-10-12
Payer: COMMERCIAL

## 2023-10-12 DIAGNOSIS — R29.898 LEFT ARM WEAKNESS: Primary | ICD-10-CM

## 2023-10-12 DIAGNOSIS — G44.86 CERVICOGENIC HEADACHE: ICD-10-CM

## 2023-10-12 PROCEDURE — 97110 THERAPEUTIC EXERCISES: CPT | Performed by: PHYSICAL THERAPIST

## 2023-10-12 ASSESSMENT — ENCOUNTER SYMPTOMS
DEPRESSION: 0
OCCASIONAL FEELINGS OF UNSTEADINESS: 0
LOSS OF SENSATION IN FEET: 0

## 2023-10-12 ASSESSMENT — PAIN - FUNCTIONAL ASSESSMENT: PAIN_FUNCTIONAL_ASSESSMENT: 0-10

## 2023-10-12 ASSESSMENT — PAIN SCALES - GENERAL: PAINLEVEL_OUTOF10: 1

## 2023-10-12 NOTE — PROGRESS NOTES
Physical Therapy    Physical Therapy Treatment    Patient Name: Yanni Gore  MRN: [id number]  Today's Date: 10/12/2023  Time Calculation  Start Time: 0145  Stop Time: 0230  Time Calculation (min): 45 min      Assessment:   Her Lt shoulder AROM is the same as her past treatment and less laboring noted during her motion.  Her strength with MMT is about the same but her endurance is improving.   Continue with current treatment and added resisted wall clocks.       Plan:   Continued skilled PT necessary to abolish cervical and Lt shoulder symptoms, increase Lt shoulder ROM, strength and overall function with her dominant arm.            Current Problem  1. Left arm weakness        2. Cervicogenic headache            Subjective   Pt states her Lt shoulder is feeling pretty good today but her neck is really stiff.    5 out of 8 PT treatments.        Pain Assessment: 0-10  Pain Score: 1  Pain Type: Chronic pain  Pain Location: Shoulder    Objective       Lt shoulder AROM: flexion 140, abduction 120, ER 60.  She is able to reach behind her back to L4-5 and behind her neck to C6-7.      4-4+/5 gross Lt shoulder strength       Treatments:      UBE, F/R, L3, 3 minutes each way  Wall slides, flexion and scaption with lift, 1.5 lb, 30 reps *  Wall alphabet, 1.5 lb, 1 rep  Ball on wall, 1.1 lb, 30 reps  Med ball series, 3-way, 2.2 lbs, 30 reps  IR stretch with strap, 10 sec hold, 10 reps *  Standing wands, 2 lb, (5), 30 reps *  Shoulder pulleys, flexion and abduction, 30 reps * - NP  Rows, shoulder extension with scapular retraction, horizontal abduction and ER with scapular retractions, GTB, 20-30 reps *  Wall taps with 1.1 lb, 12 reps *  Wall clocks, YTB, 5 reps *        Goals:    Abolish all cervical and Lt shoulder symptoms.  Maintain proper cervical alignment in sitting  Lt shoulder AROM WFL's and pain free  Normal Lt shoulder strength  No difficulty or pain during her basic, instrumental and work activities  Independent  with her HEP

## 2023-10-16 ENCOUNTER — TREATMENT (OUTPATIENT)
Dept: PHYSICAL THERAPY | Facility: CLINIC | Age: 58
End: 2023-10-16
Payer: COMMERCIAL

## 2023-10-16 ENCOUNTER — APPOINTMENT (OUTPATIENT)
Dept: PHYSICAL THERAPY | Facility: CLINIC | Age: 58
End: 2023-10-16
Payer: COMMERCIAL

## 2023-10-16 DIAGNOSIS — G44.86 CERVICOGENIC HEADACHE: ICD-10-CM

## 2023-10-16 DIAGNOSIS — R29.898 LEFT ARM WEAKNESS: Primary | ICD-10-CM

## 2023-10-16 PROCEDURE — 97110 THERAPEUTIC EXERCISES: CPT | Performed by: PHYSICAL THERAPIST

## 2023-10-16 ASSESSMENT — ENCOUNTER SYMPTOMS
LOSS OF SENSATION IN FEET: 0
DEPRESSION: 0
OCCASIONAL FEELINGS OF UNSTEADINESS: 0

## 2023-10-16 ASSESSMENT — PAIN SCALES - GENERAL: PAINLEVEL_OUTOF10: 2

## 2023-10-16 ASSESSMENT — PAIN - FUNCTIONAL ASSESSMENT: PAIN_FUNCTIONAL_ASSESSMENT: 0-10

## 2023-10-16 NOTE — PROGRESS NOTES
Physical Therapy    Physical Therapy Treatment    Patient Name: Yanni Gore  MRN: [id number]  Today's Date: 10/16/2023  Time Calculation  Start Time: 1015  Stop Time: 1055  Time Calculation (min): 40 min      Assessment:   Her Lt shoulder AROM and strength have improved and her overall function is much better noting no significant difficulty with her basic and instrumental ADL's.   Continue with current treatment today with verbal and visual cueing to perform her ex's properly.       Plan:  Plan on rechecking her in 1 week with discharge most likely secondary to her Lt shoulder AROM, strength and overall function has improved.         Current Problem  1. Left arm weakness        2. Cervicogenic headache            Subjective   Pt states her Lt shoulder is feeling better and stronger.  She's having no issues putting on her jacket and doing her normal daily activities.      6 out of 8 PT treatments     Precautions  Precautions  STEADI Fall Risk Score (The score of 4 or more indicates an increased risk of falling): 1       Pain  Pain Assessment: 0-10  Pain Score: 2  Pain Type: Chronic pain  Pain Location: Neck    Objective      Lt shoulder AROM: flexion 145, abduction 130, ER 60.  She is able to reach behind her back to L4-5 and behind her neck to C6-7.      4+-5/5 gross Lt shoulder strength       Treatments:    UBE, F/R, L3, 3 minutes each way  Wall slides, flexion and scaption with lift, 1.5 lb, 30 reps *  Wall alphabet, 1.5 lb, 1 rep  Ball on wall, 1.1 lb, 30 reps  Med ball series, 3-way, 2.2 lbs, 30 reps  IR stretch with strap, 10 sec hold, 10 reps *  Standing wands, 2 lb, (5), 30 reps *  Shoulder pulleys, flexion and abduction, 30 reps * - NP  Rows, shoulder extension with scapular retraction, horizontal abduction and ER with scapular retractions, GTB, 20-30 reps *  Wall taps with 1.1 lb, 10 reps x 2 *  Wall clocks, YTB, 10 reps *       Goals:  Abolish all cervical and Lt shoulder symptoms.  Maintain proper  cervical alignment in sitting  Lt shoulder AROM WFL's and pain free  Normal Lt shoulder strength  No difficulty or pain during her basic, instrumental and work activities  Independent with her HEP

## 2023-10-20 ENCOUNTER — APPOINTMENT (OUTPATIENT)
Dept: PHYSICAL THERAPY | Facility: CLINIC | Age: 58
End: 2023-10-20
Payer: COMMERCIAL

## 2024-01-10 ENCOUNTER — APPOINTMENT (OUTPATIENT)
Dept: PAIN MEDICINE | Facility: HOSPITAL | Age: 59
End: 2024-01-10
Payer: COMMERCIAL

## 2024-01-29 ENCOUNTER — OFFICE VISIT (OUTPATIENT)
Dept: PAIN MEDICINE | Facility: HOSPITAL | Age: 59
End: 2024-01-29
Payer: COMMERCIAL

## 2024-01-29 DIAGNOSIS — G44.86 CERVICOGENIC HEADACHE: Primary | ICD-10-CM

## 2024-01-29 DIAGNOSIS — R29.898 LEFT ARM WEAKNESS: ICD-10-CM

## 2024-01-29 PROCEDURE — 99214 OFFICE O/P EST MOD 30 MIN: CPT | Performed by: ANESTHESIOLOGY

## 2024-01-29 ASSESSMENT — PAIN SCALES - GENERAL: PAINLEVEL: 8
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
## 2024-02-13 RX ORDER — IBUPROFEN 800 MG/1
TABLET ORAL
Qty: 120 TABLET | OUTPATIENT
Start: 2024-02-13

## 2024-02-13 NOTE — TELEPHONE ENCOUNTER
Comments:  Patient advised that an appointment would be needed but she wanted the request sent anyway.      Last Office Visit (last PCP visit):   1/10/2022    Next Visit Date:  No future appointments.    **If hasn't been seen in over a year OR hasn't followed up according to last diabetes/ADHD visit, make appointment for patient before sending refill to provider.    Rx requested:  Requested Prescriptions     Pending Prescriptions Disp Refills    ibuprofen (ADVIL;MOTRIN) 800 MG tablet 120 tablet

## 2024-02-20 ENCOUNTER — HOSPITAL ENCOUNTER (OUTPATIENT)
Dept: RADIOLOGY | Facility: HOSPITAL | Age: 59
Discharge: HOME | End: 2024-02-20
Payer: COMMERCIAL

## 2024-02-20 VITALS
HEART RATE: 67 BPM | SYSTOLIC BLOOD PRESSURE: 117 MMHG | OXYGEN SATURATION: 97 % | BODY MASS INDEX: 27.46 KG/M2 | DIASTOLIC BLOOD PRESSURE: 64 MMHG | TEMPERATURE: 97.5 F | WEIGHT: 155 LBS | HEIGHT: 63 IN | RESPIRATION RATE: 18 BRPM

## 2024-02-20 DIAGNOSIS — G44.86 CERVICOGENIC HEADACHE: ICD-10-CM

## 2024-02-20 PROCEDURE — 99152 MOD SED SAME PHYS/QHP 5/>YRS: CPT | Performed by: ANESTHESIOLOGY

## 2024-02-20 PROCEDURE — 99153 MOD SED SAME PHYS/QHP EA: CPT | Performed by: ANESTHESIOLOGY

## 2024-02-20 PROCEDURE — 64633 DESTROY CERV/THOR FACET JNT: CPT | Performed by: ANESTHESIOLOGY

## 2024-02-20 PROCEDURE — 64634 DESTROY C/TH FACET JNT ADDL: CPT | Mod: LT | Performed by: ANESTHESIOLOGY

## 2024-02-20 PROCEDURE — 64634 DESTROY C/TH FACET JNT ADDL: CPT | Performed by: ANESTHESIOLOGY

## 2024-02-20 PROCEDURE — 2500000004 HC RX 250 GENERAL PHARMACY W/ HCPCS (ALT 636 FOR OP/ED): Performed by: ANESTHESIOLOGY

## 2024-02-20 PROCEDURE — 77003 FLUOROGUIDE FOR SPINE INJECT: CPT

## 2024-02-20 PROCEDURE — 64633 DESTROY CERV/THOR FACET JNT: CPT | Mod: LT | Performed by: ANESTHESIOLOGY

## 2024-02-20 PROCEDURE — 2720000007 HC OR 272 NO HCPCS

## 2024-02-20 RX ORDER — MIDAZOLAM HYDROCHLORIDE 1 MG/ML
INJECTION INTRAMUSCULAR; INTRAVENOUS
Status: COMPLETED | OUTPATIENT
Start: 2024-02-20 | End: 2024-02-20

## 2024-02-20 RX ORDER — FENTANYL CITRATE 50 UG/ML
INJECTION, SOLUTION INTRAMUSCULAR; INTRAVENOUS
Status: COMPLETED | OUTPATIENT
Start: 2024-02-20 | End: 2024-02-20

## 2024-02-20 RX ADMIN — FENTANYL CITRATE 50 MCG: 50 INJECTION, SOLUTION INTRAMUSCULAR; INTRAVENOUS at 14:22

## 2024-02-20 RX ADMIN — MIDAZOLAM HYDROCHLORIDE 2 MG: 1 INJECTION INTRAMUSCULAR; INTRAVENOUS at 14:22

## 2024-02-20 ASSESSMENT — PAIN SCALES - GENERAL
PAINLEVEL_OUTOF10: 5 - MODERATE PAIN
PAINLEVEL_OUTOF10: 6
PAINLEVEL_OUTOF10: 1
PAINLEVEL_OUTOF10: 5 - MODERATE PAIN
PAINLEVEL_OUTOF10: 5 - MODERATE PAIN
PAINLEVEL_OUTOF10: 1
PAINLEVEL_OUTOF10: 5 - MODERATE PAIN
PAINLEVEL_OUTOF10: 5 - MODERATE PAIN

## 2024-02-20 ASSESSMENT — PAIN - FUNCTIONAL ASSESSMENT
PAIN_FUNCTIONAL_ASSESSMENT: 0-10
PAIN_FUNCTIONAL_ASSESSMENT: 0-10
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
## 2024-02-20 NOTE — PROCEDURES
Pre-Op Diagnosis: Cervical spondylosis   Post-Procedure Diagnosis: Same as preop diagnosis  Procedure: Radiofrequency neurotomy of the left third occipital nerve as well as the medial branches of C3, C4 and C5 on the left side  Surgeon: Naveed Booth MD, PhD   Resident/Fellow/Other Assistant: Nnamdi Mireles MD     Procedure Note:     Ms. Yanni Gore is a 58 y.o. female with cervical spondylosis and left-sided cervical spine pain presents for radiofrequency neurotomy of the left cervical facets as well as the third occipital nerve for cervicogenic headache.    Following informed consent, the patient was brought to the operating room and placed in the prone position.  The back of the neck area was prepped in sterile fashion using chlorhexidine and draped with sterile drapes.  Using fluoroscopic guidance, the skin and subcutaneous tissues overlying needle trajectories were anesthetized with as total of 2 cc 0.5% lidocaine to the left of midline.  18-gauge radiofrequency needles with 5 mm active tips were then advanced under fluoroscopic guidance the appropriate target sites needle positions were confirmed in AP and lateral views.  Stimulation at 50 Hz resulted in paresthesia at currents below 0.5 V at all 3 levels.  Motor stimulation at 2 Hz to a current of 1.2 V did not result in any muscular contractions in the extremities. Thereafter, 1.5 cc of 2% lidocaine and was injected at each needle tip and radiofrequency lesioning was then carried out at 80° for 90 seconds ×2.  At the end of the procedure, the needles were removed and the patient was transferred to the recovery area in stable condition.  The patient will update us on symptom response and progress on outpatient basis.

## 2024-04-01 ENCOUNTER — TELEMEDICINE (OUTPATIENT)
Dept: PAIN MEDICINE | Facility: HOSPITAL | Age: 59
End: 2024-04-01
Payer: COMMERCIAL

## 2024-04-01 DIAGNOSIS — G44.86 CERVICOGENIC HEADACHE: Primary | ICD-10-CM

## 2024-04-01 PROCEDURE — 99212 OFFICE O/P EST SF 10 MIN: CPT | Performed by: ANESTHESIOLOGY

## 2024-04-01 NOTE — PROGRESS NOTES
History Of Present Illness  Yanni Gore is a 58 y.o. female with a past medical history of cervical spondylosis presents for follow-up after having received a left third occipital nerve radiofrequency neurotomy along with radiofrequency lesioning of the left C3, C4 and C5 medial nerve branches.  According to the patient, the procedure totally relieved her left-sided cervicogenic headache and neck pain.  She is very pleased with the results.  She is inquiring about the possibility of repeat procedures when the pain may recur.         Past Medical History  She has a past medical history of Other conditions influencing health status.    Surgical History  She has a past surgical history that includes Hysterectomy.     Social History  She reports that she quit smoking about 20 years ago. Her smoking use included cigarettes. She does not have any smokeless tobacco history on file. She reports that she does not currently use alcohol. She reports that she does not use drugs.    Family History  No family history on file.     Allergies  Patient has no known allergies.    Review of Symptoms:   Constitutional:  Negative for unexpected weight change.   HENT:  Negative for sinus pain.    Eyes:  Negative for visual disturbance.   Respiratory:  Negative for shortness of breath.    Cardiovascular:  Negative for chest pain.   Endocrine: Negative for polyphagia.   Genitourinary:  Negative for genital sores.   Hematological:  Negative for adenopathy.   Psychiatric/Behavioral:  Negative for hallucinations and suicidal ideas.        PHYSICAL EXAM  Given the nature of telemedicine, the physical examination is limited  The patient appears awake, alert and oriented  Constitutional: Patient is well-appearing in no acute distress  Psych: Normal affect  Respiratory: Nonlabored breathing          Relevant Results  Current Outpatient Medications   Medication Instructions    ibuprofen 800 mg, oral, 3 times daily PRN    methocarbamol (ROBAXIN)  500 mg, oral, 3 times daily PRN    naproxen (EC NAPROSYN) 500 mg, oral, 2 times daily PRN    oxyCODONE-acetaminophen (Percocet) 5-325 mg tablet 1 tablet, oral, Every 6 hours PRN    topiramate (Topamax) 100 mg tablet 1 tablet, oral, 2 times daily    topiramate (Topamax) 200 mg tablet 1 tablet, oral, 2 times daily    topiramate (TOPAMAX) 25 mg, oral         XR cervical spine 2-3 views     Narrative  MRN: [id number]  Patient Name: MARK RAMACHANDRAN    STUDY:  SPINE, CERVICAL, 2 OR 3 VIEWS    INDICATION:  CERVICAL PAIN  M54.2: Cervical pain S12.000A: C1 cervical fracture.    COMPARISON:  September 8, 2022    ACCESSION NUMBER(S):  [id number]    ORDERING CLINICIAN:  DYLAN BARON    FINDINGS:  Mild cervical degenerative change at C6-7 similar to the previous  study.    Lateral subluxation of the masses at C1 consistent with the known  history of C1 arch fracture.      IMPRESSION:  C1 fracture changes grossly similar to previous study.  Electronically signed by: JANET GONZALEZ MD      XR CERVICAL SPINE 2-3 VIEWS 01/16/2023    Narrative  MRN: [id number]  Patient Name: MARK RAMACHANDRAN    STUDY:  SPINE, CERVICAL, 2 OR 3 VIEWS    INDICATION:  CERVICAL PAIN  M54.2: Cervical pain S12.000A: C1 cervical fracture.    COMPARISON:  September 8, 2022    ACCESSION NUMBER(S):  [id number]    ORDERING CLINICIAN:  DYLAN BARON    FINDINGS:  Mild cervical degenerative change at C6-7 similar to the previous  study.    Lateral subluxation of the masses at C1 consistent with the known  history of C1 arch fracture.    Impression  C1 fracture changes grossly similar to previous study.      MR cervical spine wo IV contrast 09/30/2022    Narrative  MRN: [id number]  Patient Name: MARK RAMACHANDRAN    STUDY:  MRI CERVICAL WO;  9/30/2022 11:36 am    INDICATION:  neck pain  M54.2: Cervical pain  PT WAIT IN MAIN [email or web address] LOBBY.    COMPARISON:  None.    ACCESSION NUMBER(S):  [id number]    ORDERING CLINICIAN:  NOEMY TRUONG    TECHNIQUE:  Sagittal T1, T2, STIR, axial T1  and axial T2 weighted images were  acquired through the cervical spine.    FINDINGS:  Alignment: The mid to lower cervical spine is mildly straightened. C4  has minimal anterolisthesis on C5 with similar anterolisthesis at  C5-6 and slight retrolisthesis at C6-7.    Vertebrae/Intervertebral Discs: The vertebral bodies demonstrate  expected height.  The marrow signal is within normal limits. The C5-6  and C6-7 disc heights are mildly decreased.    Cord: The spinal cord is partially obscured by artifact especially on  the sagittal T2 images. There is a band of hyperintensity overlying  the cord centrally from C5-6 to C7-T1 on the sagittal T2 images with  no corresponding abnormality on the axial T2 images.    Craniocervical junction: There is no mass of the foramen magnum. The  atlanto odontoid articulation has mild degenerative changes.    C2-3: No stenosis is noted.    C3-4: No stenosis is noted.    C4-5: The thecal sac is mildly indented by disc bulge with no  significant stenosis noted.    C5-6: Disc protrusion mildly to moderately stenosis the canal with  flattening of the cord in the midline.    C6-7: The spinal canal is mildly stenosed by disc bulge and  uncovertebral spurring.    C7-T1 through T3-4: No stenoses are noted.    The prevertebral and posterior paraspinous soft tissues are within  normal limits.    Impression  Degenerative changes as noted.    The spinal cord has a few linear hyperintensities on the sagittal T2  images most likely artifactual in nature as they are not confirmed on  the axial images. Otherwise no intrinsic cord abnormalities are noted.      XR cervical spine 2-3 views     Narrative  MRN: [id number]  Patient Name: MARK RAMACHANDRAN    STUDY:  SPINE, CERVICAL, 2 OR 3 VIEWS; ;  5/19/2022 10:11 am    INDICATION:  C1 fracture  S12.000A: C1 cervical fracture.    COMPARISON:  None.    ACCESSION NUMBER(S):  [id number]    ORDERING CLINICIAN:  DYLAN BARON    FINDINGS:  The known fracture of  posterior arch of C1 partially visualized with  mild bilateral subluxation of bilateral masses C1.  Osteopenia is present. The prevertebral soft tissues are unremarkable.    Endplate sclerosis and spur formation is seen throughout the cervical  spine. Mild narrowing of C5-6 and C6-7 disc spaces noted.    No acute fracture or dislocation is seen.    IMPRESSION:  Redemonstration of C1 fracture, as described above.    Degenerative changes.    Electronically signed by: MILAN MERAZ MD      XR CERVICAL SPINE 2-3 VIEWS 05/19/2022    Narrative  MRN: [id number]  Patient Name: MARK RAMACHANDRAN    STUDY:  SPINE, CERVICAL, 2 OR 3 VIEWS; ;  5/19/2022 10:11 am    INDICATION:  C1 fracture  S12.000A: C1 cervical fracture.    COMPARISON:  None.    ACCESSION NUMBER(S):  [id number]    ORDERING CLINICIAN:  DYLAN BARON    FINDINGS:  The known fracture of posterior arch of C1 partially visualized with  mild bilateral subluxation of bilateral masses C1.  Osteopenia is present. The prevertebral soft tissues are unremarkable.    Endplate sclerosis and spur formation is seen throughout the cervical  spine. Mild narrowing of C5-6 and C6-7 disc spaces noted.    No acute fracture or dislocation is seen.    Impression  Redemonstration of C1 fracture, as described above.    Degenerative changes.      XR cervical spine 2-3 views     Narrative  MRN: [id number]  Patient Name: MARK RAMACHANDRAN    STUDY:  SPINE, CERVICAL, 2 OR 3 VIEWS; ;  4/21/2022 10:02 am    INDICATION:  C1 lateral mass fracture  S12.000A: C1 cervical fracture.    COMPARISON:  03/31/2022    ACCESSION NUMBER(S):  [id number]    ORDERING CLINICIAN:  DYLAN BARON    FINDINGS:  Previous described posterior arch C1 fracture is again partially  visualized. There is stable appearing mild lateral subluxation of the  lateral mass of C1 relative to C2. No additional subluxation is seen.  Alignment appears overall unchanged. Background of multilevel  spondylosis appears stable. Multilevel disc  space narrowing.  Multilevel facet arthropathy Multilevel uncovertebral  hypertrophy.Multilevel osteophyte formation.    IMPRESSION:  Stable appearing alignment since comparison radiograph 03/31/2022  with redemonstration of partially imaged fracture of the posterior C1  arch and mild bilateral subluxation of the bilateral masses of C1.    Electronically signed by: DANYELL STEVENS MD      XR CERVICAL SPINE 2-3 VIEWS 04/21/2022    Narrative  MRN: [id number]  Patient Name: MARK RAMACHANDRAN    STUDY:  SPINE, CERVICAL, 2 OR 3 VIEWS; ;  4/21/2022 10:02 am    INDICATION:  C1 lateral mass fracture  S12.000A: C1 cervical fracture.    COMPARISON:  03/31/2022    ACCESSION NUMBER(S):  [id number]    ORDERING CLINICIAN:  DYLAN BARON    FINDINGS:  Previous described posterior arch C1 fracture is again partially  visualized. There is stable appearing mild lateral subluxation of the  lateral mass of C1 relative to C2. No additional subluxation is seen.  Alignment appears overall unchanged. Background of multilevel  spondylosis appears stable. Multilevel disc space narrowing.  Multilevel facet arthropathy Multilevel uncovertebral  hypertrophy.Multilevel osteophyte formation.    Impression  Stable appearing alignment since comparison radiograph 03/31/2022  with redemonstration of partially imaged fracture of the posterior C1  arch and mild bilateral subluxation of the bilateral masses of C1.      XR cervical spine 2-3 views     Narrative  MRN: [id number]  Patient Name: MARK RAMACHANDRAN    STUDY:  SPINE, CERVICAL, 2 OR 3 VIEWS; C-SPINE 1 VIEW    INDICATION:  CERVICAL PAIN  M54.2: Cervical pain; C1 lateral mass fracture  S12.000A: C1 cervical fracture.    COMPARISON:  CT cervical spine performed on March 22    ACCESSION NUMBER(S):  [id number]; [id number]    ORDERING CLINICIAN:  DYLAN BARON    FINDINGS:  Minimally displaced posterior arch C1 fractures again noted. Lateral  subluxation of the bilateral lateral masses of C1. No  subluxation.  Prevertebral soft tissues normal.    IMPRESSION:  Posterior arch C1 fracture with displacement of the lateral masses.  Electronically signed by: JANET GONZALEZ MD      XR CERVICAL SPINE 2-3 VIEWS 03/31/2022    Narrative  MRN: [id number]  Patient Name: MARK RAMACHANDRAN    STUDY:  SPINE, CERVICAL, 2 OR 3 VIEWS; C-SPINE 1 VIEW    INDICATION:  CERVICAL PAIN  M54.2: Cervical pain; C1 lateral mass fracture  S12.000A: C1 cervical fracture.    COMPARISON:  CT cervical spine performed on March 22    ACCESSION NUMBER(S):  [id number]; [id number]    ORDERING CLINICIAN:  DYLAN BARON    FINDINGS:  Minimally displaced posterior arch C1 fractures again noted. Lateral  subluxation of the bilateral lateral masses of C1. No subluxation.  Prevertebral soft tissues normal.    Impression  Posterior arch C1 fracture with displacement of the lateral masses.      ASSESSMENT/PLAN  Mark Ramachandran is a 58 y.o. female with a past medical history of cervical spondylosis presents for follow-up after having received a left third occipital nerve radiofrequency neurotomy along with radiofrequency lesioning of the left C3, C4 and C5 medial nerve branches.  She has had total relief of her left-sided neck pain and cervicogenic headache since the procedure performed on February 20.  She is inquiring about repeat radiofrequency neurotomy procedures in the future should the pain recur.         Our plan is as follows:  -The patient was reassured that she may have repeat radiofrequency neurotomy procedures in the future should the pain return.  She was encouraged to maintain an active exercise program to minimize the chance of recurrence of pain  - Continue to participate in physical therapy as well as physician directed home exercises  - Continue pain medications as prescribed       Naveed Booth MD PhD

## 2024-09-19 ENCOUNTER — APPOINTMENT (OUTPATIENT)
Dept: PAIN MEDICINE | Facility: CLINIC | Age: 59
End: 2024-09-19
Payer: COMMERCIAL

## 2024-09-19 DIAGNOSIS — G44.86 CERVICOGENIC HEADACHE: ICD-10-CM

## 2024-09-19 PROCEDURE — 99214 OFFICE O/P EST MOD 30 MIN: CPT | Performed by: ANESTHESIOLOGY
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
## 2024-10-03 ENCOUNTER — APPOINTMENT (OUTPATIENT)
Dept: PAIN MEDICINE | Facility: CLINIC | Age: 59
End: 2024-10-03
Payer: COMMERCIAL

## 2024-10-23 NOTE — H&P
HISTORY AND PHYSICAL    History Of Present Illness  Yanni Gore is a 58 y.o. female presenting with cervical spondylosis and cervicogenic headaches following a C1 fx.  Here for LEFT third occipital nerve and cervical medial branch radiofrequency ablation    she denies any recent antibiotic use or infections, she denies any blood thinner use , and she denies contrast or local anesthetic allergies     Pre-sedation evaluation:  ASA Classification (bolded):   ASA I: Healthy patient, non-smoking, no none or minimal alcohol use  ASA II: Patient with mild systemic disease, without substantiative functional limitations.  Current smoker, social alcohol drinker, pregnancy, obesity (BMI 30-40)DM/HTN,, well-controlled mild lung disease  ASA III: Patient with severe systemic disease; substantiative of functional limitation; One or more moderate to severe diseases: Poorly controlled DM/HTN, COPD, morbid obesity (BMI>40), active hepatitis, alcohol abuse/dependence, implanted pacemaker, moderate reduction of ejection fraction, ESRD on dialysis, history (>3months) of MI, CVA, TIA or CAD/stents  ASA IV: Patient with severe systemic disease that is a constant threat to life; recent (<3 months) MI, CVA, TIA or CAD/stents, ongoing cardiac ischemia or severe valvular dysfunction, severely reduced ejection fraction, shock, sepsis, DIC, ESRD not undergoing regular scheduled dialysis    Mallampati score (bolded):   Class I: Complete visualization of the soft palate  Class II: Complete visualization of the uvula  Class III: Visualization of only the base of the uvula  Class IV: Soft palate is not visible at all    Past Medical History  Past Medical History:   Diagnosis Date    Other conditions influencing health status     Healthy adult       Surgical History  Past Surgical History:   Procedure Laterality Date    HYSTERECTOMY          Social History  She reports that she quit smoking about 20 years ago. Her smoking use included cigarettes.  "She does not have any smokeless tobacco history on file. She reports that she does not currently use alcohol. She reports that she does not use drugs.    Family History  No family history on file.     Allergies  Patient has no known allergies.    Review of Systems   12 point ROS done and negative except for the above.   Physical Exam     General: NAD, well groomed, well nourished  Eyes: Non-icteric sclera, EOMI  Ears, Nose, Mouth, and Throat: External ears and nose appear to be without deformity or rash. No lesions or masses noted. Hearing is grossly intact.   Neck: Trachea midline  Respiratory: Nonlabored breathing   Cardiovascular: No peripheral edema   Skin: No rashes or open lesions/ulcers identified on skin.    Last Recorded Vitals  There were no vitals taken for this visit.    Relevant Results  Current Outpatient Medications   Medication Instructions    ibuprofen 800 mg, oral, 3 times daily PRN    methocarbamol (ROBAXIN) 500 mg, oral, 3 times daily PRN    naproxen (EC NAPROSYN) 500 mg, oral, 2 times daily PRN    oxyCODONE-acetaminophen (Percocet) 5-325 mg tablet 1 tablet, oral, Every 6 hours PRN    topiramate (Topamax) 100 mg tablet 1 tablet, oral, 2 times daily    topiramate (Topamax) 200 mg tablet 1 tablet, oral, 2 times daily    topiramate (TOPAMAX) 25 mg, oral      No results found for: \"WBC\", \"HGB\", \"HCT\", \"MCV\", \"PLT\"   No results found for: \"INR\", \"PROTIME\"  No results found for: \"PTT\"  No results found for: \"GLUCOSE\", \"CALCIUM\", \"NA\", \"K\", \"CO2\", \"CL\", \"BUN\", \"CREATININE\"    === 05/18/23 ===    MR B PLEXUS WO AND W CONTRAST    - Impression -  Unremarkable MRI of the brachial plexus.       Assessment/Plan   Yanni Gore is a 58 y.o. F who presents for LEFT third occipital nerve and cervical medial branch radiofrequency ablation .     Risks, benefits, alternatives discussed. All questions answered to the best of my ability. Patient agrees to proceed. Consent signed and patient marked " appropriately.    -We will proceed with planned procedure      Driss Simmons MD  Pain Fellow

## 2024-10-24 ENCOUNTER — HOSPITAL ENCOUNTER (OUTPATIENT)
Facility: HOSPITAL | Age: 59
Discharge: HOME | End: 2024-10-24
Payer: COMMERCIAL

## 2024-10-24 VITALS
TEMPERATURE: 98.1 F | BODY MASS INDEX: 26.58 KG/M2 | DIASTOLIC BLOOD PRESSURE: 82 MMHG | HEIGHT: 63 IN | OXYGEN SATURATION: 96 % | SYSTOLIC BLOOD PRESSURE: 120 MMHG | WEIGHT: 150 LBS | RESPIRATION RATE: 16 BRPM | HEART RATE: 67 BPM

## 2024-10-24 DIAGNOSIS — G44.86 CERVICOGENIC HEADACHE: ICD-10-CM

## 2024-10-24 PROCEDURE — 3700000012 HC SEDATION LEVEL 5+ TIME - INITIAL 15 MINUTES 5/> YEARS

## 2024-10-24 PROCEDURE — 3700000013 HC SEDATION LEVEL 5+ TIME - EACH ADDITIONAL 15 MINUTES

## 2024-10-24 PROCEDURE — 2720000007 HC OR 272 NO HCPCS

## 2024-10-24 PROCEDURE — 2500000004 HC RX 250 GENERAL PHARMACY W/ HCPCS (ALT 636 FOR OP/ED): Performed by: ANESTHESIOLOGY

## 2024-10-24 RX ORDER — FENTANYL CITRATE 50 UG/ML
INJECTION, SOLUTION INTRAMUSCULAR; INTRAVENOUS
Status: DISCONTINUED
Start: 2024-10-24 | End: 2024-10-25 | Stop reason: HOSPADM

## 2024-10-24 RX ORDER — LIDOCAINE HYDROCHLORIDE 20 MG/ML
INJECTION, SOLUTION EPIDURAL; INFILTRATION; INTRACAUDAL; PERINEURAL
Status: COMPLETED | OUTPATIENT
Start: 2024-10-24 | End: 2024-10-24

## 2024-10-24 RX ORDER — MIDAZOLAM HYDROCHLORIDE 1 MG/ML
INJECTION, SOLUTION INTRAMUSCULAR; INTRAVENOUS
Status: COMPLETED | OUTPATIENT
Start: 2024-10-24 | End: 2024-10-24

## 2024-10-24 RX ORDER — MIDAZOLAM HYDROCHLORIDE 1 MG/ML
INJECTION INTRAMUSCULAR; INTRAVENOUS
Status: DISCONTINUED
Start: 2024-10-24 | End: 2024-10-25 | Stop reason: HOSPADM

## 2024-10-24 RX ORDER — LIDOCAINE HYDROCHLORIDE 5 MG/ML
INJECTION, SOLUTION INFILTRATION; INTRAVENOUS
Status: DISCONTINUED
Start: 2024-10-24 | End: 2024-10-25 | Stop reason: HOSPADM

## 2024-10-24 RX ORDER — FENTANYL CITRATE 50 UG/ML
INJECTION, SOLUTION INTRAMUSCULAR; INTRAVENOUS
Status: COMPLETED | OUTPATIENT
Start: 2024-10-24 | End: 2024-10-24

## 2024-10-24 RX ORDER — LIDOCAINE HYDROCHLORIDE 5 MG/ML
INJECTION, SOLUTION INFILTRATION; INTRAVENOUS
Status: COMPLETED | OUTPATIENT
Start: 2024-10-24 | End: 2024-10-24

## 2024-10-24 RX ORDER — LIDOCAINE HYDROCHLORIDE 20 MG/ML
INJECTION, SOLUTION EPIDURAL; INFILTRATION; INTRACAUDAL; PERINEURAL
Status: DISCONTINUED
Start: 2024-10-24 | End: 2024-10-25 | Stop reason: HOSPADM

## 2024-10-24 RX ORDER — ACETAMINOPHEN 500 MG
1000 TABLET ORAL EVERY 6 HOURS PRN
COMMUNITY

## 2024-10-24 ASSESSMENT — PAIN - FUNCTIONAL ASSESSMENT
PAIN_FUNCTIONAL_ASSESSMENT: WONG-BAKER FACES
PAIN_FUNCTIONAL_ASSESSMENT: 0-10
PAIN_FUNCTIONAL_ASSESSMENT: WONG-BAKER FACES
PAIN_FUNCTIONAL_ASSESSMENT: WONG-BAKER FACES
PAIN_FUNCTIONAL_ASSESSMENT: 0-10
PAIN_FUNCTIONAL_ASSESSMENT: WONG-BAKER FACES
PAIN_FUNCTIONAL_ASSESSMENT: 0-10

## 2024-10-24 ASSESSMENT — PAIN SCALES - GENERAL
PAINLEVEL_OUTOF10: 3
PAINLEVEL_OUTOF10: 1
PAINLEVEL_OUTOF10: 0 - NO PAIN
PAINLEVEL_OUTOF10: 3
PAINLEVEL_OUTOF10: 5 - MODERATE PAIN
PAINLEVEL_OUTOF10: 3
PAINLEVEL_OUTOF10: 3

## 2024-10-24 ASSESSMENT — ENCOUNTER SYMPTOMS
OCCASIONAL FEELINGS OF UNSTEADINESS: 0
LOSS OF SENSATION IN FEET: 0
DEPRESSION: 0

## 2024-10-24 NOTE — DISCHARGE INSTRUCTIONS
DISCHARGE INSTRUCTIONS FOR INJECTIONS     You underwent LEFT third occipital nerve and cervical medial branch radiofrequency ablation today    After most injections, it is recommended that you relax and limit your activity for the remainder of the day unless you have been told otherwise by your pain physician.  You should not drive a car, operate machinery, or make important legal decisions unless otherwise directed by your pain physician.  You may resume your normal activity, including exercise, tomorrow.      Keep a written pain diary of how much pain relief you experienced following the injection procedure and the length of time of pain relief you experienced pain relief. Following diagnostic injections like medial branch nerve blocks, sacroiliac joint blocks, stellate ganglion injections and other blocks, it is very important you record the specific amount of pain relief you experienced immediately after the injectionand how long it lasted. Your doctor will ask you for this information at your follow up visit.     For all injections, please keep the injection site dry and inspect the site for a couple of days. You may remove the Band-Aid the day of the injection at any time.     Some discomfort, bruising or slight swelling may occur at the injection site. This is not abnormal if it occurs.  If needed you may:    -Take over the counter medication such as Tylenol or Motrin.   -Apply an ice pack for 30 minutes, 2 to 3 times a day for the first 24 hours.     You may shower today; no soaking baths, hot tubs, whirlpools or swimming pools for two days.      If you are given steroids in your injection, it may take 3-5 days for the steroid medication to take effect. You may notice a worsening of your symptoms for 1-2 days after the injection. This is not abnormal.  You may use acetaminophen, ibuprofen, or prescription medication that your doctor may have prescribed for you if you need to do so.     A few common side  effects of steroids include facial flushing, sweating, restlessness, irritability,difficulty sleeping, increase in blood sugar, and increased blood pressure. If you have diabetes, please monitor your blood sugar at least once a day for at least 5 days. If you have poorly controlled high blood pressure, monitoryour blood pressure for at least 2 days and contact your primary care physician if these numbers are unusually high for you.      If you take aspirin or non-steroidal anti-inflammatory drugs (examples are Motrin, Advil, ibuprofen, Naprosyn, Voltaren, Relafen, etc.) you may restart these this evening, but stop taking it 3 days before your next appointment, unless instructed otherwiseby your physician.      You do not need to discontinue non-aspirin-containing pain medications prior to an injection (examples: Celebrex, tramadol, hydrocodone and acetaminophen).      If you take a blood thinning medication (Coumadin, Lovenox, Fragmin,Ticlid, Plavix, Pradaxa, etc.), please discuss this with your primary care physician/cardiologist and your pain physician. These medications MUST be discontinued before you can have an injection safely, without the risk of uncontrolled bleeding. If these medications are not discontinued for an appropriate period of time, you will not be able to receivean injection. Please adhere to instructions given to you about when to restart your blood thinning medication. If you have any questions please reach out to our team.    If you are taking Coumadin, please have your INR checked the morning of your procedure and bring the result to your appointment unless otherwise instructed. If your INR is over 1.2, your injection may need to be rescheduled to avoid uncontrolled bleeding from the needle placement.     Call [facility]  and ask for Pain Management at [phone] between 8am-4pm Monday - Friday if you are experiencing the following:    If you received an epidural or spinal injection:     -Headache that doesnot go away with medicine, is worse when sitting or standing up, and is greatly relieved upon lying down.   -Severe pain worse than or different than your baseline pain.   -Chills or fever (101º F or greater).   -Drainage or signs of infection at the injection site     Go directly to the Emergency Department if you are experiencing the following and received an epidural or spinal injection:   -Abrupt weakness or progressive weakness in your legs that starts after you leave the clinic.   -Abrupt severe or worsening numbness in your legs.   -Inability to urinate after the injection or loss of bowel or bladder control without the urge to defecate or urinate.     If you have a clinical question that cannot wait until your next appointment, please call [phone] between 8am-4pm Monday - Friday or send a [employer] message. We do our best to return all non-emergency messages within 24 hours, Monday - Friday. A nurse or physician will return your message. You may also try calling and they will do their best to answer your question(s):  - Dr. Artur Nieves's nurse ([phone])  - Dr. Paula Stone/Dr. Clark's nurse ([phone])  - Dr. Keyla Salazar/Leobardo's nurse ([phone])     If you need to cancel an appointment, please call the scheduling staff at [phone] during normal business hours or leave a message at least 24 hours in advance.     If you are going to be sedated for your next procedure, you MUST have responsible adult who can legally drive accompany you home. You cannot eat or drink for at least eight hours prior to the planned procedure if you are going to receive sedation. You may take your non-blood thinning medications with a small sip of water.